# Patient Record
Sex: MALE | Race: WHITE | Employment: STUDENT | URBAN - METROPOLITAN AREA
[De-identification: names, ages, dates, MRNs, and addresses within clinical notes are randomized per-mention and may not be internally consistent; named-entity substitution may affect disease eponyms.]

---
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
## 2022-03-18 PROBLEM — Q28.2 AVM (ARTERIOVENOUS MALFORMATION) BRAIN: Status: ACTIVE | Noted: 2017-11-10

## 2022-03-18 PROBLEM — I60.4 SUBARACHNOID HEMORRHAGE FROM BASILAR ARTERY ANEURYSM (HCC): Status: ACTIVE | Noted: 2017-11-10

## 2022-03-18 PROBLEM — R29.898 OTHER SYMPTOMS AND SIGNS INVOLVING THE MUSCULOSKELETAL SYSTEM: Status: ACTIVE | Noted: 2017-12-15

## 2022-03-18 PROBLEM — F43.23 ADJUSTMENT DISORDER WITH MIXED ANXIETY AND DEPRESSED MOOD: Status: ACTIVE | Noted: 2018-01-10

## 2022-03-18 PROBLEM — F51.01 PRIMARY INSOMNIA: Status: ACTIVE | Noted: 2018-08-07

## 2022-03-18 PROBLEM — F43.25 ADJUSTMENT DISORDER WITH MIXED DISTURBANCE OF EMOTIONS AND CONDUCT: Status: ACTIVE | Noted: 2018-02-05

## 2022-03-19 PROBLEM — I69.028: Status: ACTIVE | Noted: 2017-12-18

## 2022-03-19 PROBLEM — F90.9 ATTENTION-DEFICIT HYPERACTIVITY DISORDER, UNSPECIFIED TYPE: Status: ACTIVE | Noted: 2018-09-19

## 2022-03-19 PROBLEM — H53.2 DOUBLE VISION WITH BOTH EYES OPEN: Status: ACTIVE | Noted: 2019-07-31

## 2022-03-19 PROBLEM — R27.8 COORDINATION IMPAIRMENT: Status: ACTIVE | Noted: 2017-12-15

## 2022-03-19 PROBLEM — Z86.79: Status: ACTIVE | Noted: 2019-08-05

## 2022-03-19 PROBLEM — R47.1 DYSARTHRIA: Status: ACTIVE | Noted: 2019-07-31

## 2022-03-19 PROBLEM — I60.4: Status: ACTIVE | Noted: 2019-07-29

## 2022-03-19 PROBLEM — I69.010: Status: ACTIVE | Noted: 2017-12-21

## 2022-03-19 PROBLEM — I69.014: Status: ACTIVE | Noted: 2017-12-15

## 2022-03-19 PROBLEM — Z74.09 DECREASED FUNCTIONAL MOBILITY AND ENDURANCE: Status: ACTIVE | Noted: 2017-12-15

## 2022-03-19 PROBLEM — G44.89 OTHER HEADACHE SYNDROME: Status: ACTIVE | Noted: 2017-12-15

## 2022-03-19 PROBLEM — G47.33 OBSTRUCTIVE SLEEP APNEA, PEDIATRIC: Status: ACTIVE | Noted: 2018-08-07

## 2022-03-20 PROBLEM — I10 ESSENTIAL HYPERTENSION: Status: ACTIVE | Noted: 2017-12-21

## 2022-03-20 PROBLEM — E66.9 CHILDHOOD OBESITY, BMI 95-100 PERCENTILE: Status: ACTIVE | Noted: 2018-11-06

## 2022-08-04 ENCOUNTER — OFFICE VISIT (OUTPATIENT)
Dept: NEUROLOGY | Age: 22
End: 2022-08-04
Payer: COMMERCIAL

## 2022-08-04 VITALS
OXYGEN SATURATION: 96 % | HEART RATE: 70 BPM | BODY MASS INDEX: 46.65 KG/M2 | HEIGHT: 69 IN | WEIGHT: 315 LBS | SYSTOLIC BLOOD PRESSURE: 115 MMHG | DIASTOLIC BLOOD PRESSURE: 78 MMHG

## 2022-08-04 DIAGNOSIS — I69.010 ATTENTION AND CONCENTRATION DEFICIT FOLLOWING NONTRAUMATIC SUBARACHNOID HEMORRHAGE: ICD-10-CM

## 2022-08-04 DIAGNOSIS — I69.014 FRONTAL LOBE AND EXECUTIVE FUNCTION DEFICIT FOLLOWING NONTRAUMATIC SUBARACHNOID HEMORRHAGE: ICD-10-CM

## 2022-08-04 DIAGNOSIS — I69.028 OTHER SPEECH AND LANGUAGE DEFICITS FOLLOWING NONTRAUMATIC SUBARACHNOID HEMORRHAGE: ICD-10-CM

## 2022-08-04 DIAGNOSIS — Z86.79 HISTORY OF SPONTANEOUS SUBARACHNOID INTRACRANIAL HEMORRHAGE: Primary | ICD-10-CM

## 2022-08-04 PROBLEM — M62.81 MUSCLE WEAKNESS (GENERALIZED): Status: ACTIVE | Noted: 2019-08-20

## 2022-08-04 PROBLEM — F01.A0 MILD VASCULAR NEUROCOGNITIVE DISORDER: Status: ACTIVE | Noted: 2019-11-12

## 2022-08-04 PROBLEM — F06.70 MILD VASCULAR NEUROCOGNITIVE DISORDER: Status: ACTIVE | Noted: 2019-11-12

## 2022-08-04 PROBLEM — M54.50 CHRONIC MIDLINE LOW BACK PAIN WITHOUT SCIATICA: Status: ACTIVE | Noted: 2019-08-20

## 2022-08-04 PROBLEM — I99.9 MILD VASCULAR NEUROCOGNITIVE DISORDER: Status: ACTIVE | Noted: 2019-11-12

## 2022-08-04 PROBLEM — G89.29 CHRONIC MIDLINE LOW BACK PAIN WITHOUT SCIATICA: Status: ACTIVE | Noted: 2019-08-20

## 2022-08-04 PROCEDURE — 99214 OFFICE O/P EST MOD 30 MIN: CPT | Performed by: NURSE PRACTITIONER

## 2022-08-04 NOTE — PROGRESS NOTES
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    IR OCCLUSIVE OR EMBOL PERC CENTL NERV SYS  7/30/2019    IR OCCLUSIVE OR EMBOL PERC CENTL NERV SYS  7/30/2019    IR OCCLUSIVE OR EMBOL PERC CENTL NERV SYS 7/30/2019 [facility] SPECI[facility]       Family History   Problem Relation Age of Onset    High Cholesterol Mother     Thyroid Disease Mother     Hypertension Mother        Social History     Socioeconomic History    Marital status: Single   Tobacco Use    Smoking status: Never    Smokeless tobacco: Never         Current Outpatient Medications:     MELATONIN PO, Take by mouth as needed, Disp: , Rfl:     atorvastatin (LIPITOR) 40 MG tablet, Take 40 mg by mouth, Disp: , Rfl:     busPIRone (BUSPAR) 10 MG tablet, Take 10 mg by mouth 2 times daily, Disp: , Rfl:     clonazePAM (KLONOPIN) 0.5 MG tablet, as needed. , Disp: , Rfl:     FLUoxetine (PROZAC) 20 MG capsule, Take 60 mg by mouth daily, Disp: , Rfl:     lisinopril (PRINIVIL;ZESTRIL) 10 MG tablet, Take 10 mg by mouth daily, Disp: , Rfl:     solifenacin (VESICARE) 5 MG tablet, TAKE 1 TABLET BY MOUTH EVERY DAY, Disp: , Rfl:     Not on File    REVIEW OF SYSTEMS:  CONSTITUTIONAL: No weight loss, fever, chills, weakness or fatigue. HEENT: Eyes: No visual loss, blurred vision, double vision or yellow sclerae. Ears, Nose, Throat: No hearing loss, sneezing, congestion, runny nose or sore throat. SKIN: No rash or itching. CARDIOVASCULAR: No chest pain, chest pressure or chest discomfort. No palpitations or edema. RESPIRATORY: No shortness of breath, cough or sputum. GASTROINTESTINAL: No anorexia, nausea, vomiting or diarrhea. No abdominal pain or blood. GENITOURINARY: no burning with urination. NEUROLOGICAL: sensory changes on right No headache, dizziness, syncope, paralysis, ataxia, numbness or tingling in the extremities. No change in bowel or bladder control.   MUSCULOSKELETAL: No muscle, back pain, joint pain or stiffness. HEMATOLOGIC: No anemia, bleeding or bruising. LYMPHATICS: No enlarged nodes. No history of splenectomy. PSYCHIATRIC: No history of depression or anxiety. ENDOCRINOLOGIC: No reports of sweating, cold or heat intolerance. No polyuria or polydipsia. ALLERGIES: No history of asthma, hives, eczema or rhinitis. Physical Examination  /78 (Site: Left Upper Arm, Position: Sitting, Cuff Size: Large Adult)   Pulse 70   Ht 5' 9\" (1.753 m)   Wt (!) 321 lb 6.4 oz (145.8 kg)   SpO2 96%   BMI 47.46 kg/m²     General - Well developed, obese, in no apparent distress. Pleasant and conversent. HEENT - Normocephalic, atraumatic. Conjunctiva, tympanic membranes, and oropharynx are clear. Neck - Supple without masses, no bruits   Cardiovascular - Regular rate and rhythm. Lungs - Clear to auscultation. Abdomen - Soft, nontender with normal bowel sounds. Extremities - Peripheral pulses intact. No edema and no rashes. Neurological examination - Alert and oriented to person, place, month, and year. Able to follow 1 step commands, mild difficulty with 2 step commands. Comprehension fair , attention fair , memory and reasoning are intact. Language and speech are normal. On cranial nerve examination pupils are equal round and reactive to light. Fundoscopic examination is normal. Visual acuity is adequate. Visual fields are full to finger confrontation. Extraocular motility is normal. Face is symmetric and sensation is intact to light touch. Hearing is intact to finger rustle bilaterally. Motor examination - There is normal muscle tone and bulk. Power is full throughout. Muscle stretch reflexes are normoactive and there are no pathological reflexes present. Decreased sensation on RUE/LE. Sensation is intact to light touch, pinprick, vibration and proprioception in all extremities. Cerebellar examination is normal finger/nose and heel/shin.  Gait and stance are normal.       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 Jeet Gutter Episode of acute headache  with nausea one month ago. TECHNIQUE: Sagittal and axial T1-weighted, axial T2-weighted, axial FLAIR, axial  T2-weighted gradient-echo, axial diffusion weighted images with ADC maps and  postcontrast axial and coronal T1-weighted images of the brain. 30cc of  ProHance gadolinium contrast was administered intravenously without adverse  event to evaluate for pathological leptomeningeal or parenchymal enhancement. COMPARISON: CTA head and neck November 17, 2021 and catheter angiogram 9/8/2020     FINDINGS: There is abnormal signal in the left paramedian superior sunny at the  site of the previously treated AVM. There is no acute intracranial hemorrhage,  acute infarction, hydrocephalus, or extra-axial hematoma. The cerebellar tonsils are in a normal position. There is no abnormal  parenchymal or leptomeningeal enhancement in the supratentorial brain. On  postcontrast images, there is diffuse enhancement within the left hemipons at  the site of the AVM. There is minimal artifact from an endovascular coil pack  adjacent to the basilar artery. Impression     1. Enhancement and signal loss in the left hemipons at the site of the  previously treated AVM. 2. No acute intracranial hemorrhage or infarct. MRA HEAD W WO CONTRAST 12/10/2021    Narrative  HISTORY: 25years of age Male with history of brainstem AVM with coiling of  intranidal aneurysm after rupture in December 2017 and embolization of  intranidal aneurysm after rupture in December 2019. EXAM/TECHNIQUE: MRA BRAIN W WO CONT.  Magnetic resonance angiography of the head  was performed without contrast. 3-D multiplanar reconstructions were performed  at the workstation. COMPARISON: Multiple prior CTA had exams with the most recent on 11/17/2021.    11/17/2021 CTA head examination demonstrated tumoral-like enhancement within the  left aspect of the brainstem consistent with the patient's known vascular  malformation in this region. There was otherwise a suboptimal examination with  metallic artifact from apparent coil embolization of the internal anal aneurysm. FINDINGS:  Right Internal Carotid Artery: Patent. Left Internal Carotid Artery: Patent. Anterior Cerebral Arteries:  Right A1 Segment: Patent. Left A1: Hypoplastic but grossly patent. Bilateral A2 segments are patent. Bilateral A3 segments appear grossly patent. Right Middle Cerebral Arteries:  Right M1 is patent. Major proximal MCA branches beyond the bifurcation are  patent. Left Middle Cerebral Arteries:  Left M1 is patent. Major proximal MCA branches beyond the bifurcation are  patent. Vertebral Artery Dominance: Codominant. Right Vertebral Artery: Patent. Left Vertebral Artery: Patent. Basilar Artery: Patent. Right Posterior Cerebral Artery: Appears patent to the level of the proximal P3  segment and not well evaluated beyond this level. Left Posterior Cerebral Artery: Appears patent to the level of the proximal P3  segment and not well evaluated beyond this level. Intracranial Aneurysms or vascular malformations: There is redemonstration of  serpiginous tumoral-like enhancement within the left upper aspect of the sunny in  the region of the patient's known arteriovenous malformation. There is no  evidence of well-defined intranidal aneurysm. .  Intracranial Proximal Vessel Occlusion: None. Dural venous sinuses: Appear diffusely patent. Impression  Redemonstration of serpiginous tubular-like enhancement within the left upper  aspect of the sunyn in the region of the patient's known left brainstem  arteriovenous malformation. No evidence of well-defined intranidal aneurysm. No  proximal vessel occlusion in the major intracranial arterial vasculature. Lalo Galloway was seen today for follow-up. Diagnoses and all orders for this visit:    History of spontaneous subarachnoid intracranial hemorrhage  Maintain SBP <140/90  Discussed Mediterranean diet and increasing cardiovascular exercise to goal of 30 minutes daily. Depression screening completed. Reviewed BE FAST and when to call 911. Other speech and language deficits following nontraumatic subarachnoid hemorrhage    Frontal lobe and executive function deficit following nontraumatic subarachnoid hemorrhage    Attention and concentration deficit following nontraumatic subarachnoid hemorrhage    Follow up in 1 year or sooner if needed    I spent greater than 50% of the 30 total minutes of today's visit in coordination of care and patient/family education and counseling regarding the above patient concerns, reviewing the patient's medical record, my assessment and recommendations.         April Soto, [address] [place] [address] Neurology [address] [place] [address] 08, [address]  [facility]:[phone]

## 2022-08-05 ASSESSMENT — PATIENT HEALTH QUESTIONNAIRE - PHQ9
SUM OF ALL RESPONSES TO PHQ QUESTIONS 1-9: 0
1. LITTLE INTEREST OR PLEASURE IN DOING THINGS: 0
SUM OF ALL RESPONSES TO PHQ QUESTIONS 1-9: 0
SUM OF ALL RESPONSES TO PHQ9 QUESTIONS 1 & 2: 0
SUM OF ALL RESPONSES TO PHQ QUESTIONS 1-9: 0
SUM OF ALL RESPONSES TO PHQ QUESTIONS 1-9: 0
2. FEELING DOWN, DEPRESSED OR HOPELESS: 0

## 2023-07-06 ENCOUNTER — HOSPITAL ENCOUNTER (OUTPATIENT)
Dept: CT IMAGING | Age: 23
Discharge: HOME | End: 2023-07-06
Attending: NEUROLOGICAL SURGERY
Payer: COMMERCIAL

## 2023-07-06 DIAGNOSIS — I67.1 CEREBRAL ANEURYSM: ICD-10-CM

## 2023-07-06 PROCEDURE — 6360000004 HC RX CONTRAST MEDICATION: Performed by: NEUROLOGICAL SURGERY

## 2023-07-06 PROCEDURE — 2580000003 HC RX 258: Performed by: NEUROLOGICAL SURGERY

## 2023-07-06 PROCEDURE — 70496 CT ANGIOGRAPHY HEAD: CPT

## 2023-07-06 RX ORDER — SODIUM CHLORIDE 0.9 % (FLUSH) 0.9 %
10 SYRINGE (ML) INJECTION
Status: COMPLETED | OUTPATIENT
Start: 2023-07-06 | End: 2023-07-06

## 2023-07-06 RX ORDER — 0.9 % SODIUM CHLORIDE 0.9 %
100 INTRAVENOUS SOLUTION INTRAVENOUS
Status: COMPLETED | OUTPATIENT
Start: 2023-07-06 | End: 2023-07-06

## 2023-07-06 RX ADMIN — SODIUM CHLORIDE, PRESERVATIVE FREE 10 ML: 5 INJECTION INTRAVENOUS at 11:14

## 2023-07-06 RX ADMIN — IOPAMIDOL 50 ML: 755 INJECTION, SOLUTION INTRAVENOUS at 11:13

## 2023-07-06 RX ADMIN — SODIUM CHLORIDE 100 ML: 9 INJECTION, SOLUTION INTRAVENOUS at 11:14

## 2023-08-18 ENCOUNTER — OFFICE VISIT (OUTPATIENT)
Dept: NEUROLOGY | Age: 23
End: 2023-08-18

## 2023-08-18 VITALS
BODY MASS INDEX: 47.74 KG/M2 | DIASTOLIC BLOOD PRESSURE: 97 MMHG | SYSTOLIC BLOOD PRESSURE: 134 MMHG | HEIGHT: 68 IN | WEIGHT: 315 LBS | HEART RATE: 70 BPM | OXYGEN SATURATION: 98 %

## 2023-08-18 DIAGNOSIS — I69.019: ICD-10-CM

## 2023-08-18 DIAGNOSIS — E66.01 MORBID OBESITY (HCC): ICD-10-CM

## 2023-08-18 DIAGNOSIS — G47.33 OSA (OBSTRUCTIVE SLEEP APNEA): ICD-10-CM

## 2023-08-18 DIAGNOSIS — Z86.79 HISTORY OF SPONTANEOUS SUBARACHNOID INTRACRANIAL HEMORRHAGE: Primary | ICD-10-CM

## 2023-08-18 ASSESSMENT — PATIENT HEALTH QUESTIONNAIRE - PHQ9
1. LITTLE INTEREST OR PLEASURE IN DOING THINGS: 0
SUM OF ALL RESPONSES TO PHQ QUESTIONS 1-9: 0
2. FEELING DOWN, DEPRESSED OR HOPELESS: 0
SUM OF ALL RESPONSES TO PHQ9 QUESTIONS 1 & 2: 0
SUM OF ALL RESPONSES TO PHQ QUESTIONS 1-9: 0

## 2023-08-18 ASSESSMENT — ENCOUNTER SYMPTOMS
GASTROINTESTINAL NEGATIVE: 1
EYES NEGATIVE: 1
RESPIRATORY NEGATIVE: 1

## 2023-08-18 NOTE — PROGRESS NOTES
[facility] Neurology [facility]  [address] [facility]  [address]  [facility]      Chief Complaint   Patient presents with    Follow-up     SAH       Nikos Fairchild is a 26 y.o. male who presents for follow up for [facility]. PMH significant for [facility] s/p embolization, anxiety, HTN, obesity    Interval history:    He is here today with his mother. He is doing well. Denies headaches, seizures or focal deficits. Endorses mild difficulty with concentration and memory,improving. Continues to have endorse urinary dribbling. He has followed up with Dr. Nakul Briones in July, repeat CTA stable. History of sleep apnea-he is unable to tolerate CPAP. Mother stated that he tends to \"alligator roll\" while he is sleeping and his mask tends to fall off. He was previously followed by pediatric sleep medicine, mother would like referral to adult sleep medicine for further management and treatment. Denies feelings of depression or SI. He is sedentary.      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    IR OCCLUSIVE OR EMBOL PERC CENTL NERV SYS  7/30/2019    IR OCCLUSIVE OR EMBOL PERC CENTL NERV SYS  7/30/2019    IR OCCLUSIVE OR EMBOL PERC CENTL NERV SYS 7/30/2019 [facility] SPE[facility]       Family History   Problem Relation Age of Onset    High Cholesterol Mother     Thyroid Disease Mother     Hypertension Mother        Social History     Socioeconomic History    Marital status: Single     Spouse name: None    Number of children: None    Years of education: None    Highest education level: None   Tobacco Use    Smoking status: Never    Smokeless tobacco: Never   Vaping Use    Vaping Use: Never used   Substance and Sexual Activity    Alcohol

## 2024-03-06 NOTE — PROGRESS NOTES
pounds since his sleep study.  Due to risk of central apnea as well as obstructive, will order in lab sleep study.    The pathophysiology of obstructive sleep apnea was reviewed with the patient.  It's potential to promote severe neurologic, cardiac, pulmonary, and gastrointestinal problems was discussed. Specifically, the increased incidence of hypertension, coronary artery disease, congestive heart failure, pulmonary hypertension, gastroesophageal reflux, pathologic hypersomnolence, memory loss, and glucose intolerance was related to the consequences of hypoxemia, hypercapnia, airway obstruction, and sympathetic overdrive.  We also discussed the ability of nasal CPAP to correct these abnormalities through maintenance of a patent airway.  Therapeutic options including surgery, oral appliances, and weight loss were also reviewed.     2. Morbid obesity (HCC)     50 pound weight gain since his previous study with a current weight of 335 pounds.BMI 50.54     3. Non-restorative sleep     With untreated sleep apnea     4. Poor sleep hygiene     He is napping 5 to 6 days of the week, having excessive caffeine during the day.  The day of the study, it was recommended to avoid napping.  No caffeine after 2 PM.     5. 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     6. Primary insomnia     Likely due to napping, caffeine, untreated sleep apnea   7. Hypersomnia- ESS 14/24.          PLAN:  In lab split-night PSG using a nasal mask.  Will request a recliner for the sleep study for mom.  Avoid napping the day of the study and avoid caffeine after 2 PM  Follow-up after study    Orders Placed This Encounter   Procedures    Ambulatory Referral to Sleep Studies     Referral Priority:   Routine     Referral Type:   Consult for Advice and Opinion     Referral Reason:   Specialty Services Required     Number of Visits Requested:   1     No orders of the defined types were placed in this

## 2024-03-07 ENCOUNTER — TELEMEDICINE (OUTPATIENT)
Dept: SLEEP MEDICINE | Age: 24
End: 2024-03-07
Payer: COMMERCIAL

## 2024-03-07 DIAGNOSIS — I60.4 SUBARACHNOID HEMORRHAGE FROM BASILAR ARTERY ANEURYSM (HCC): ICD-10-CM

## 2024-03-07 DIAGNOSIS — Z72.821 POOR SLEEP HYGIENE: ICD-10-CM

## 2024-03-07 DIAGNOSIS — G47.10 HYPERSOMNIA: ICD-10-CM

## 2024-03-07 DIAGNOSIS — F51.01 PRIMARY INSOMNIA: ICD-10-CM

## 2024-03-07 DIAGNOSIS — E66.01 MORBID OBESITY (HCC): ICD-10-CM

## 2024-03-07 DIAGNOSIS — G47.8 NON-RESTORATIVE SLEEP: ICD-10-CM

## 2024-03-07 DIAGNOSIS — G47.33 OSA (OBSTRUCTIVE SLEEP APNEA): Primary | ICD-10-CM

## 2024-03-07 PROCEDURE — 99203 OFFICE O/P NEW LOW 30 MIN: CPT | Performed by: NURSE PRACTITIONER

## 2024-03-07 ASSESSMENT — SLEEP AND FATIGUE QUESTIONNAIRES
HOW LIKELY ARE YOU TO NOD OFF OR FALL ASLEEP WHILE SITTING AND TALKING TO SOMEONE: 0
HOW LIKELY ARE YOU TO NOD OFF OR FALL ASLEEP WHILE LYING DOWN TO REST IN THE AFTERNOON WHEN CIRCUMSTANCES PERMIT: 3
HOW LIKELY ARE YOU TO NOD OFF OR FALL ASLEEP WHILE SITTING INACTIVE IN A PUBLIC PLACE: 1
HOW LIKELY ARE YOU TO NOD OFF OR FALL ASLEEP WHILE WATCHING TV: 2
ESS TOTAL SCORE: 14
HOW LIKELY ARE YOU TO NOD OFF OR FALL ASLEEP WHILE SITTING QUIETLY AFTER LUNCH WITHOUT ALCOHOL: 3
HOW LIKELY ARE YOU TO NOD OFF OR FALL ASLEEP WHEN YOU ARE A PASSENGER IN A CAR FOR AN HOUR WITHOUT A BREAK: 3
HOW LIKELY ARE YOU TO NOD OFF OR FALL ASLEEP WHILE SITTING AND READING: 2
HOW LIKELY ARE YOU TO NOD OFF OR FALL ASLEEP IN A CAR, WHILE STOPPED FOR A FEW MINUTES IN TRAFFIC: 0

## 2024-04-16 ENCOUNTER — HOSPITAL ENCOUNTER (OUTPATIENT)
Dept: SLEEP CENTER | Age: 24
Discharge: HOME | End: 2024-04-19
Payer: COMMERCIAL

## 2024-04-16 PROCEDURE — 95810 POLYSOM 6/> YRS 4/> PARAM: CPT

## 2024-05-14 ENCOUNTER — TELEPHONE (OUTPATIENT)
Dept: SLEEP MEDICINE | Age: 24
End: 2024-05-14

## 2024-05-14 DIAGNOSIS — G47.33 OSA (OBSTRUCTIVE SLEEP APNEA): Primary | ICD-10-CM

## 2024-05-14 NOTE — TELEPHONE ENCOUNTER
Left message to discuss sleep study. '    Split-night PSG 2024 with AHI 12 events per hour, supine AHI 26.3 events per hour.  No REM noted during diagnostic portion.  Lowest oxygen saturation was 83%.  He was titrated to a pressure of 8 cm.  AutoPap 8 to 10 cm was recommended.    In reviewing our last note, he was willing to start CPAP therapy with a nasal mask.  Will go ahead and send this order to [facility].    Aracelis, needs follow up appt in 3-4 months for new start cpap follow up.     Orders Placed This Encounter   Procedures    DME - DURABLE MEDICAL EQUIPMENT     [facility]  New start cpap      [facility]  Phone: [address]  [facility] [address]  Dept: [phone]      Patient Name: Sukhi Walton  : 2000  Gender: male  Address: [address] [place] [id number]  Patient phone number: [phone] (home)       Primary Insurance: Payor: [employer] / Plan: Di[employer]OP PPO / Product Type: *No Product type* /   Subscriber ID: [id number] - (Commercial)      AMB Supply Order  Order Details     DME Location:    Order Date: 2024   There were no encounter diagnoses.          (  X   )New Set-Up     apap machine   (     ) CPAP Unit  (   x  ) Auto CPAP Unit  (     ) BiLevel Unit  (     ) Auto BiLevel Unit  (     ) ASV   (     ) Bilevel ST    (     ) Oxygen Concentrator         Length of need: 12 months    Pressure: 8-10  cmH20  EPR:      Starting Ramp Pressure:   cm H20  Ramp Time: min      Patient had a diagnostic Apnea Hypopnea Index (AHI) of :      *SUPPLIES* Replace all as needed, or per coverage guidelines     Masks Type:    (    ) -Full Face Mask (1 per 3 mon)  (     ) -Full Mask (1 per month) Interface/Cushion      (   x  ) -Nasal Mask (1 per 3 mon)  (   x  ) - Nasal Mask (1 per month) Interface/Cushion  (   x  ) -Pillow (2 per mon)  ( x    ) -Xsthoiwpd (1 per 6

## 2024-05-15 ENCOUNTER — TELEPHONE (OUTPATIENT)
Dept: SLEEP MEDICINE | Age: 24
End: 2024-05-15

## 2024-05-15 NOTE — TELEPHONE ENCOUNTER
Please advise on the central apnea. ----- Message from Sukhi Walton sent at 5/14/2024 11:36 AM EDT -----  Regarding: sleep study results  Contact: [phone]  I am so sorry that I missed you call. I tried to call back but the hold wait is long. I am free anytime this afternoon if you are free to call back. I will have my phone in hand.     Does he have central apnea? I know that was one of the things you were going to look at due to his two strokes. I've also noticed recently that his breathing is labored during the day.      I look forward to hearing back you!! Thank you so much!!

## 2024-08-18 NOTE — PROGRESS NOTES
[facility] Neurology [address], [address]  [place] [address]  [phone]      Chief Complaint   Patient presents with    Follow-up     SAH       Sukhi Walton is a 24 y.o. male who presents for follow up for SAH.    PMH significant for SAH s/p embolization, anxiety, HTN, obesity    Interval history:    He is here today with his mother.  Denies headaches, seizures or focal deficits. Continues to endorse  mild difficulty with concentration and memory. His mother noted that his moods have been more labile, noted that he gets frustrated easily. Also noted that he has been having increased difficulty with simple task such as dressing, stated when he puts his pants on it takes him multiple attempts before he is successful.     Hx of tourette's, however mother noted that he is not counting the attempts or showing OCD mannerisms. He is currently followed by psychiatry and was previously followed by neuropsych at [facility].     History of sleep apnea-followed by sleep medicine, plan to start CPAP.     Denies feelings of depression or SI. He is sedentary.     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

## 2024-08-19 ENCOUNTER — OFFICE VISIT (OUTPATIENT)
Dept: NEUROLOGY | Age: 24
End: 2024-08-19
Payer: COMMERCIAL

## 2024-08-19 VITALS
HEIGHT: 68 IN | WEIGHT: 315 LBS | HEART RATE: 88 BPM | DIASTOLIC BLOOD PRESSURE: 83 MMHG | BODY MASS INDEX: 47.74 KG/M2 | OXYGEN SATURATION: 95 % | SYSTOLIC BLOOD PRESSURE: 129 MMHG

## 2024-08-19 DIAGNOSIS — I69.019: ICD-10-CM

## 2024-08-19 DIAGNOSIS — F07.0 PERSONALITY CHANGE AS LATE EFFECT OF CEREBROVASCULAR ACCIDENT (CVA): ICD-10-CM

## 2024-08-19 DIAGNOSIS — Z86.79 HISTORY OF SPONTANEOUS SUBARACHNOID INTRACRANIAL HEMORRHAGE: Primary | ICD-10-CM

## 2024-08-19 DIAGNOSIS — I69.398 PERSONALITY CHANGE AS LATE EFFECT OF CEREBROVASCULAR ACCIDENT (CVA): ICD-10-CM

## 2024-08-19 DIAGNOSIS — E66.01 MORBID OBESITY (HCC): ICD-10-CM

## 2024-08-19 DIAGNOSIS — G47.33 OSA (OBSTRUCTIVE SLEEP APNEA): ICD-10-CM

## 2024-08-19 PROBLEM — I60.4 SUBARACHNOID HEMORRHAGE FROM BASILAR ARTERY ANEURYSM (HCC): Status: RESOLVED | Noted: 2017-11-10 | Resolved: 2024-08-19

## 2024-08-19 PROCEDURE — 99214 OFFICE O/P EST MOD 30 MIN: CPT | Performed by: NURSE PRACTITIONER

## 2024-08-19 PROCEDURE — 3074F SYST BP LT 130 MM HG: CPT | Performed by: NURSE PRACTITIONER

## 2024-08-19 PROCEDURE — 3079F DIAST BP 80-89 MM HG: CPT | Performed by: NURSE PRACTITIONER

## 2024-08-19 RX ORDER — CLONAZEPAM 0.5 MG/1
TABLET ORAL
COMMUNITY
End: 2024-08-19 | Stop reason: ALTCHOICE

## 2024-08-19 ASSESSMENT — PATIENT HEALTH QUESTIONNAIRE - PHQ9
SUM OF ALL RESPONSES TO PHQ9 QUESTIONS 1 & 2: 0
2. FEELING DOWN, DEPRESSED OR HOPELESS: NOT AT ALL
1. LITTLE INTEREST OR PLEASURE IN DOING THINGS: NOT AT ALL
SUM OF ALL RESPONSES TO PHQ QUESTIONS 1-9: 0

## 2024-09-16 ENCOUNTER — TELEPHONE (OUTPATIENT)
Dept: SLEEP MEDICINE | Age: 24
End: 2024-09-16

## 2024-11-01 ENCOUNTER — PATIENT MESSAGE (OUTPATIENT)
Dept: NEUROLOGY | Age: 24
End: 2024-11-01

## 2024-11-04 DIAGNOSIS — I69.019: ICD-10-CM

## 2024-11-04 DIAGNOSIS — Z86.79 HISTORY OF SPONTANEOUS SUBARACHNOID INTRACRANIAL HEMORRHAGE: Primary | ICD-10-CM

## 2024-11-04 DIAGNOSIS — F43.25 ADJUSTMENT DISORDER WITH MIXED DISTURBANCE OF EMOTIONS AND CONDUCT: ICD-10-CM

## 2024-11-04 DIAGNOSIS — I69.014 FRONTAL LOBE AND EXECUTIVE FUNCTION DEFICIT FOLLOWING NONTRAUMATIC SUBARACHNOID HEMORRHAGE: ICD-10-CM

## 2024-11-04 DIAGNOSIS — I69.010 ATTENTION AND CONCENTRATION DEFICIT FOLLOWING NONTRAUMATIC SUBARACHNOID HEMORRHAGE: ICD-10-CM

## 2024-11-04 NOTE — PROGRESS NOTES
Patient evaluated by Neuropsych at [facility], recommendation for continued therapies. Will send referral to [facility] Young stroke program.

## 2025-01-24 NOTE — PROGRESS NOTES
[facility]  [address]  [place], [place] [address] [phone]    Patient Name:  Sukhi Walton  YOB: 2000    Sukhi Walton, was evaluated through a synchronous (real-time) audio-video encounter. The patient (or guardian if applicable) is aware that this is a billable service, which includes applicable co-pays. This Virtual Visit was conducted with patient's (and/or legal guardian's) consent. Patient identification was verified, and a caregiver was present when appropriate.   The patient was located at Home: [address] [facility] [address]  Provider was located at Home (Appt Dept State): [place]  Confirm you are appropriately licensed, registered, or certified to deliver care in the state where the patient is located as indicated above. If you are not or unsure, please re-schedule the visit: Yes, I confirm.          --Donna Hoffman, LE - CNP on 1/27/2025 at 1:33 PM             Office Visit 1/27/2025    CHIEF COMPLAINT:    Chief Complaint   Patient presents with    CPAP/BiPAP    Sleep Apnea       HISTORY OF PRESENT ILLNESS:  Patient is being seen today via virtual visit. Chronic medical problems include AVM with 2 subarachnoid hemorrhages and 2017 and 2019, personality changes after stroke, chronic midline lower back pain, VIVI, mild vascular neurocognitive disorder, adjustment disorder with mixed anxiety and depressed mood, insomnia, ADHD, Tourette syndrome, obesity.     Patient had a [facility] [place].  Weight around the time the study was 285 pounds.  AHI was 6 events per hour, REM AHI was 6.4 events per hour.  There were no leg movements or leg movement arousals or central apneas.  He tried CPAP therapy using a fullface mask but would have issues with restless sleep.  His mom describes it as \" alligator roll in his sleep\".  Medications were tried but then these caused bad dreams.  He eventually stopped using PAP therapy altogether.     Patient had

## 2025-01-27 ENCOUNTER — TELEMEDICINE (OUTPATIENT)
Dept: SLEEP MEDICINE | Age: 25
End: 2025-01-27
Payer: COMMERCIAL

## 2025-01-27 DIAGNOSIS — G47.21 DELAYED SLEEP PHASE SYNDROME: ICD-10-CM

## 2025-01-27 DIAGNOSIS — G47.8 NON-RESTORATIVE SLEEP: ICD-10-CM

## 2025-01-27 DIAGNOSIS — G47.33 OSA (OBSTRUCTIVE SLEEP APNEA): Primary | ICD-10-CM

## 2025-01-27 DIAGNOSIS — F95.2 TOURETTE'S SYNDROME: ICD-10-CM

## 2025-01-27 DIAGNOSIS — E66.01 MORBID OBESITY: ICD-10-CM

## 2025-01-27 PROCEDURE — 99214 OFFICE O/P EST MOD 30 MIN: CPT | Performed by: NURSE PRACTITIONER

## 2025-01-27 RX ORDER — ESCITALOPRAM OXALATE 20 MG/1
40 TABLET ORAL DAILY
COMMUNITY
Start: 2025-01-08

## 2025-01-27 RX ORDER — BUPROPION HYDROCHLORIDE 150 MG/1
150 TABLET ORAL EVERY MORNING
COMMUNITY
Start: 2025-01-14

## 2025-03-28 ENCOUNTER — PATIENT MESSAGE (OUTPATIENT)
Dept: NEUROLOGY | Age: 25
End: 2025-03-28

## 2025-03-28 DIAGNOSIS — F95.2 TOURETTE'S DISORDER: Primary | ICD-10-CM

## 2025-03-28 DIAGNOSIS — Z86.79 HISTORY OF SPONTANEOUS SUBARACHNOID INTRACRANIAL HEMORRHAGE: ICD-10-CM

## 2025-03-28 NOTE — TELEPHONE ENCOUNTER
[facility] to follow-up with Dr. Saida Mcdonald per patient's mother.  Referral has been completed per request.

## 2025-06-04 ENCOUNTER — TRANSCRIBE ORDERS (OUTPATIENT)
Dept: SCHEDULING | Age: 25
End: 2025-06-04

## 2025-06-04 DIAGNOSIS — Q28.2 ARTERIOVENOUS MALFORMATION OF BRAIN: Primary | ICD-10-CM

## 2025-06-04 DIAGNOSIS — I67.1 CEREBRAL ANEURYSM: ICD-10-CM

## 2025-06-30 ENCOUNTER — HOSPITAL ENCOUNTER (OUTPATIENT)
Dept: CT IMAGING | Age: 25
Discharge: HOME | End: 2025-07-02
Payer: COMMERCIAL

## 2025-06-30 DIAGNOSIS — I67.1 CEREBRAL ANEURYSM: ICD-10-CM

## 2025-06-30 DIAGNOSIS — Q28.2 ARTERIOVENOUS MALFORMATION OF BRAIN: ICD-10-CM

## 2025-06-30 PROCEDURE — 70496 CT ANGIOGRAPHY HEAD: CPT

## 2025-06-30 PROCEDURE — 6360000004 HC RX CONTRAST MEDICATION: Performed by: NEUROLOGICAL SURGERY

## 2025-06-30 RX ORDER — IOPAMIDOL 755 MG/ML
100 INJECTION, SOLUTION INTRAVASCULAR
Status: COMPLETED | OUTPATIENT
Start: 2025-06-30 | End: 2025-06-30

## 2025-06-30 RX ADMIN — IOPAMIDOL 100 ML: 755 INJECTION, SOLUTION INTRAVENOUS at 16:23

## (undated) DEVICE — CATH URETH FOL 2W MED 14FRX5 --

## (undated) DEVICE — TRAY CATH 16F URIN MTR LTX -- CONVERT TO ITEM 363111